# Patient Record
Sex: FEMALE | Employment: UNEMPLOYED | URBAN - METROPOLITAN AREA
[De-identification: names, ages, dates, MRNs, and addresses within clinical notes are randomized per-mention and may not be internally consistent; named-entity substitution may affect disease eponyms.]

---

## 2024-06-24 RX ORDER — PRAVASTATIN SODIUM 20 MG
TABLET ORAL DAILY
COMMUNITY

## 2024-06-25 ENCOUNTER — OFFICE VISIT (OUTPATIENT)
Dept: FAMILY MEDICINE CLINIC | Age: 64
End: 2024-06-25
Payer: COMMERCIAL

## 2024-06-25 VITALS
HEIGHT: 68 IN | HEART RATE: 86 BPM | TEMPERATURE: 97.3 F | DIASTOLIC BLOOD PRESSURE: 70 MMHG | BODY MASS INDEX: 40.32 KG/M2 | OXYGEN SATURATION: 96 % | WEIGHT: 266 LBS | SYSTOLIC BLOOD PRESSURE: 120 MMHG | RESPIRATION RATE: 14 BRPM

## 2024-06-25 DIAGNOSIS — Z98.84 S/P GASTRIC BYPASS: ICD-10-CM

## 2024-06-25 DIAGNOSIS — E78.5 HYPERLIPIDEMIA, UNSPECIFIED HYPERLIPIDEMIA TYPE: ICD-10-CM

## 2024-06-25 DIAGNOSIS — F41.1 GAD (GENERALIZED ANXIETY DISORDER): ICD-10-CM

## 2024-06-25 DIAGNOSIS — K21.9 GASTROESOPHAGEAL REFLUX DISEASE WITHOUT ESOPHAGITIS: ICD-10-CM

## 2024-06-25 DIAGNOSIS — E66.01 MORBID OBESITY WITH BMI OF 40.0-44.9, ADULT (HCC): ICD-10-CM

## 2024-06-25 DIAGNOSIS — R51.9 ACUTE NONINTRACTABLE HEADACHE, UNSPECIFIED HEADACHE TYPE: ICD-10-CM

## 2024-06-25 DIAGNOSIS — Z00.00 ENCOUNTER FOR MEDICAL EXAMINATION TO ESTABLISH CARE: Primary | ICD-10-CM

## 2024-06-25 DIAGNOSIS — Z87.891 FORMER SMOKER: ICD-10-CM

## 2024-06-25 DIAGNOSIS — Z23 NEED FOR DIPHTHERIA-TETANUS-PERTUSSIS (TDAP) VACCINE: ICD-10-CM

## 2024-06-25 DIAGNOSIS — F51.01 PRIMARY INSOMNIA: ICD-10-CM

## 2024-06-25 PROCEDURE — 99204 OFFICE O/P NEW MOD 45 MIN: CPT | Performed by: STUDENT IN AN ORGANIZED HEALTH CARE EDUCATION/TRAINING PROGRAM

## 2024-06-25 RX ORDER — BUTALBITAL, ACETAMINOPHEN AND CAFFEINE 50; 325; 40 MG/1; MG/1; MG/1
2 TABLET ORAL EVERY 12 HOURS PRN
Qty: 30 TABLET | Refills: 1 | Status: SHIPPED | OUTPATIENT
Start: 2024-06-25

## 2024-06-25 RX ORDER — SERTRALINE HYDROCHLORIDE 200 MG/1
200 CAPSULE ORAL DAILY
COMMUNITY

## 2024-06-25 RX ORDER — PANTOPRAZOLE SODIUM 20 MG/1
20 TABLET, DELAYED RELEASE ORAL DAILY
COMMUNITY
Start: 2024-05-02

## 2024-06-25 RX ORDER — TEMAZEPAM 15 MG/1
15 CAPSULE ORAL NIGHTLY PRN
COMMUNITY

## 2024-06-25 RX ORDER — ATORVASTATIN CALCIUM 10 MG/1
10 TABLET, FILM COATED ORAL DAILY
COMMUNITY

## 2024-06-25 RX ORDER — AMITRIPTYLINE HYDROCHLORIDE 25 MG/1
50 TABLET, FILM COATED ORAL NIGHTLY
COMMUNITY
Start: 2024-05-02

## 2024-06-25 RX ORDER — BUTALBITAL, ACETAMINOPHEN AND CAFFEINE 50; 325; 40 MG/1; MG/1; MG/1
2 TABLET ORAL EVERY 4 HOURS PRN
COMMUNITY
End: 2024-06-25 | Stop reason: SDUPTHER

## 2024-06-25 RX ORDER — PSYLLIUM HUSK (WITH SUGAR) 3.4 G/7 G
POWDER (GRAM) ORAL
COMMUNITY

## 2024-06-25 SDOH — ECONOMIC STABILITY: FOOD INSECURITY: WITHIN THE PAST 12 MONTHS, YOU WORRIED THAT YOUR FOOD WOULD RUN OUT BEFORE YOU GOT MONEY TO BUY MORE.: NEVER TRUE

## 2024-06-25 SDOH — ECONOMIC STABILITY: INCOME INSECURITY: HOW HARD IS IT FOR YOU TO PAY FOR THE VERY BASICS LIKE FOOD, HOUSING, MEDICAL CARE, AND HEATING?: NOT VERY HARD

## 2024-06-25 SDOH — ECONOMIC STABILITY: HOUSING INSECURITY
IN THE LAST 12 MONTHS, WAS THERE A TIME WHEN YOU DID NOT HAVE A STEADY PLACE TO SLEEP OR SLEPT IN A SHELTER (INCLUDING NOW)?: NO

## 2024-06-25 SDOH — ECONOMIC STABILITY: FOOD INSECURITY: WITHIN THE PAST 12 MONTHS, THE FOOD YOU BOUGHT JUST DIDN'T LAST AND YOU DIDN'T HAVE MONEY TO GET MORE.: NEVER TRUE

## 2024-06-25 ASSESSMENT — PATIENT HEALTH QUESTIONNAIRE - PHQ9
2. FEELING DOWN, DEPRESSED OR HOPELESS: NOT AT ALL
SUM OF ALL RESPONSES TO PHQ QUESTIONS 1-9: 0
1. LITTLE INTEREST OR PLEASURE IN DOING THINGS: NOT AT ALL
SUM OF ALL RESPONSES TO PHQ QUESTIONS 1-9: 0
SUM OF ALL RESPONSES TO PHQ9 QUESTIONS 1 & 2: 0

## 2024-06-25 ASSESSMENT — ANXIETY QUESTIONNAIRES
IF YOU CHECKED OFF ANY PROBLEMS ON THIS QUESTIONNAIRE, HOW DIFFICULT HAVE THESE PROBLEMS MADE IT FOR YOU TO DO YOUR WORK, TAKE CARE OF THINGS AT HOME, OR GET ALONG WITH OTHER PEOPLE: NOT DIFFICULT AT ALL
5. BEING SO RESTLESS THAT IT IS HARD TO SIT STILL: NOT AT ALL
GAD7 TOTAL SCORE: 0
3. WORRYING TOO MUCH ABOUT DIFFERENT THINGS: NOT AT ALL
4. TROUBLE RELAXING: NOT AT ALL
1. FEELING NERVOUS, ANXIOUS, OR ON EDGE: NOT AT ALL
6. BECOMING EASILY ANNOYED OR IRRITABLE: NOT AT ALL
7. FEELING AFRAID AS IF SOMETHING AWFUL MIGHT HAPPEN: NOT AT ALL
2. NOT BEING ABLE TO STOP OR CONTROL WORRYING: NOT AT ALL

## 2024-06-25 NOTE — PROGRESS NOTES
Milli Rolon  YOB: 1960    Date of Service:  6/25/2024    Chief Complaint:   Milli Rolon is a 63 y.o. female who presents for    Concerns:   Chief Complaint   Patient presents with    New Patient     No other concerns       Headache       HPI:  Patient is a 63 y.o. female  has a past medical history of history of gastric bypass, anxiety, obesity, headache, and Hyperlipidemia. who presents to [facility] care.  Recently moved from [place], [place]  Sees her PCP every 3 months.  Most recent bloodwork normal, iron low.  , CMP, TSH-within normal limits, hemoglobin 11.2 MCV 79 , hemoglobin A1c 6.2  2/2024 .  Lipid panel is normal  Not taking irorn   Headache: Weather related. Maybe 2-3 times a week.  Takes Fiorcet.  As needed.  Frontal headaches. No nausea, vomiting, light or sound sensitivity.    History of gastric bypass, has lost 160 pounds after surgery.  Weight management : Currently on compounded tirzepatide 10 mg weekly, a few months. Lost 20 lbs.   Hyperlipidemia : On Lipitor 10 mg daily   Anxiety: Mood has been stable on Zoloft 200 mg daily.  No side effects.    Insomnia: Sleeping well on Elavil 25 mg 50 mg a night and  temazepam (RESTORIL) 15 MG capsule 2 nightly.  Previously on Lunesta, Ambien, trazodone, lorazepam.  Has also tried melatonin.  Endorses difficulty staying asleep.   GERD:-Pantoprazole 20 mg daily     Social history   Lives at home with niece 2 cats and 2 dogs , 3 boys   Diet - Balanced, proteins , cottage cheese , limited fruits and vegetables   Exercise - Regular, in the house, moved 1 month ago   Tobacco use/smoking history- 30 years , quit 2013 , 1.5 packs daily   Alcohol use- limited   Illicit drug use history- medical marijuana in [place]   Mood - phq 2 of       6/25/2024     2:23 PM   PHQ-9    Little interest or pleasure in doing things 0   Feeling down, depressed, or hopeless 0   PHQ-2 Score 0   PHQ-9 Total Score 0     Sexually active - none   Menses - No LMP

## 2024-06-25 NOTE — PATIENT INSTRUCTIONS
- Continue current medications   - Follow up with Sleep medicine or Psych for temazepam (RESTORIL) 15 MG capsule  - Follow up in 3 - 6 months    ([facility]  Mental health clinic in [place], [place]  Address: [address], [place] [address]  Phone: [phone]  Appointments: [employer]  )

## 2024-06-26 ENCOUNTER — TELEPHONE (OUTPATIENT)
Dept: FAMILY MEDICINE CLINIC | Age: 64
End: 2024-06-26

## 2024-07-10 ENCOUNTER — TELEPHONE (OUTPATIENT)
Dept: PULMONOLOGY | Age: 64
End: 2024-07-10

## 2024-07-10 NOTE — TELEPHONE ENCOUNTER
Pt has upcoming appt next week and appt notes indicate that pt had a sleep study approx 2016. Spoke with pt and all she can remember is that her sleep study was done on [address] and her PAP machine came from a DME on [facility].  Pt no longer has the PAP machine.     Faxed request to [facility] (formerly sleep care) and [facility] looking for sleep studies.

## 2024-07-17 ENCOUNTER — OFFICE VISIT (OUTPATIENT)
Dept: PULMONOLOGY | Age: 64
End: 2024-07-17
Payer: COMMERCIAL

## 2024-07-17 VITALS
WEIGHT: 261 LBS | DIASTOLIC BLOOD PRESSURE: 70 MMHG | HEIGHT: 68 IN | BODY MASS INDEX: 39.56 KG/M2 | HEART RATE: 88 BPM | SYSTOLIC BLOOD PRESSURE: 122 MMHG | OXYGEN SATURATION: 99 % | RESPIRATION RATE: 20 BRPM

## 2024-07-17 DIAGNOSIS — F51.04 CHRONIC INSOMNIA: Primary | ICD-10-CM

## 2024-07-17 DIAGNOSIS — G47.33 OSA (OBSTRUCTIVE SLEEP APNEA): ICD-10-CM

## 2024-07-17 PROBLEM — Z98.84 S/P GASTRIC BYPASS: Chronic | Status: ACTIVE | Noted: 2024-06-25

## 2024-07-17 PROCEDURE — 99204 OFFICE O/P NEW MOD 45 MIN: CPT

## 2024-07-17 RX ORDER — TEMAZEPAM 15 MG/1
30 CAPSULE ORAL NIGHTLY PRN
Qty: 180 CAPSULE | Refills: 0 | Status: SHIPPED | OUTPATIENT
Start: 2024-08-01 | End: 2024-10-30

## 2024-07-17 ASSESSMENT — SLEEP AND FATIGUE QUESTIONNAIRES
ESS TOTAL SCORE: 2
HOW LIKELY ARE YOU TO NOD OFF OR FALL ASLEEP WHILE LYING DOWN TO REST IN THE AFTERNOON WHEN CIRCUMSTANCES PERMIT: WOULD NEVER DOZE
HOW LIKELY ARE YOU TO NOD OFF OR FALL ASLEEP WHILE SITTING AND TALKING TO SOMEONE: WOULD NEVER DOZE
HOW LIKELY ARE YOU TO NOD OFF OR FALL ASLEEP WHILE SITTING QUIETLY AFTER LUNCH WITHOUT ALCOHOL: WOULD NEVER DOZE
HOW LIKELY ARE YOU TO NOD OFF OR FALL ASLEEP IN A CAR, WHILE STOPPED FOR A FEW MINUTES IN TRAFFIC: WOULD NEVER DOZE
HOW LIKELY ARE YOU TO NOD OFF OR FALL ASLEEP WHILE WATCHING TV: WOULD NEVER DOZE
HOW LIKELY ARE YOU TO NOD OFF OR FALL ASLEEP WHILE SITTING AND READING: WOULD NEVER DOZE
HOW LIKELY ARE YOU TO NOD OFF OR FALL ASLEEP WHEN YOU ARE A PASSENGER IN A CAR FOR AN HOUR WITHOUT A BREAK: MODERATE CHANCE OF DOZING
HOW LIKELY ARE YOU TO NOD OFF OR FALL ASLEEP WHILE SITTING INACTIVE IN A PUBLIC PLACE: WOULD NEVER DOZE

## 2024-07-17 NOTE — PROGRESS NOTES
SLEEP MEDICINE CONSULT NOTE  CC: Insomnia  Referring Provider: Patient is being seen at the request of Dr. Adela Rebollar for a consultation to evaluate & manage Insomnia.    Presenting HPI 7/17/24:  64 yo female presents with a nearly 20 year h/o insomnia ever since menopause in her 40s.  She also has a h/o very severe LUCIANO with AHI >100 in 2017, which was prior to bariatric surgery.  She was on CPAP and tolerated it well but it did not provide any perceived benefit at all so she abandoned therapy after weight loss.  She did retry the CPAP about 2 years ago and still did not perceive benefit.  She was previously ~350-370# and is now 260#.  Drinks 150 mg caffeinated beverages/day.    Insomnia hx:  Did not have trouble sleeping before menopause, still with some menopausal sxs (hot flashes.)  Has been on pharmacotherapy since her 40s; has tried Melatonin, Lorazepam, Trazodone lost effect after 1 night, then Lorazepam seemed to lose effect, Ambien, Ambien CR and Lunesta were each tried for about 1 month and could only help with sleep for the first 1-2 hours.  Currently, she is on Temazepam; has 15 mg tabs but takes 30 most nights.  Has been on Temazepam for a few years, titrated from 15 to 30 after a few nights.  Originally started while she lived in [place] for 5 years.  Also takes Amitriptyline started for headache prophylaxis and sleep.  Does not work for HAs, and she cannot tell much difference in sleep.   Routine: To bed 10:30, watch TV until getting tired takes 15 mg Temazepam.  To sleep 11p-MN.  Sleeps for 1-2 hours then awakens and almost always needs the second dose of Temazepam.  She avoids taking them together as this previously caused enuresis when first initiating.  After waking up, she gets back to sleep quickly ~50% of the time.  Otherwise takes 45 mins of tossing and turning or reading on her ipad.  To restroom 2x per night.  Up at 8-9a.     Past Medical History:   Diagnosis Date    Anxiety     Headache

## 2024-07-17 NOTE — PROGRESS NOTES
MA Communication:  The following orders are received by verbal communication from Marcia Caballero PA-C.     Orders include:  CBTi session 1 and sleep log-GIVEN   HST and f/u  Med contract-signed by pt

## 2024-07-17 NOTE — PATIENT INSTRUCTIONS
may help reduce the risk of heart disease, stroke, car accidents, type II diabetes & all cause mortality   In the next few weeks, you will be receiving a survey from [facility] regarding your visit today.  We would greatly appreciate it if you would take just a few minutes to fill that out.  It is very important to us that our patients receive top notch care and our surveys help keep us accountable. However, if your experience was not a good one, we want to hear about that as well. This is a key way we can keep track of problems and strive to correct any for future visits.    Again, we appreciate your time and thank you for choosing [facility]!    RANCHO Guillen

## 2024-08-07 ENCOUNTER — HOSPITAL ENCOUNTER (OUTPATIENT)
Dept: SLEEP CENTER | Age: 64
Discharge: HOME | End: 2024-08-09
Payer: COMMERCIAL

## 2024-08-07 DIAGNOSIS — F51.04 CHRONIC INSOMNIA: ICD-10-CM

## 2024-08-07 PROCEDURE — 95806 SLEEP STUDY UNATT&RESP EFFT: CPT

## 2024-08-20 ASSESSMENT — SLEEP AND FATIGUE QUESTIONNAIRES
HOW LIKELY ARE YOU TO NOD OFF OR FALL ASLEEP WHILE WATCHING TV: SLIGHT CHANCE OF DOZING
HOW LIKELY ARE YOU TO NOD OFF OR FALL ASLEEP WHILE SITTING INACTIVE IN A PUBLIC PLACE: WOULD NEVER DOZE
HOW LIKELY ARE YOU TO NOD OFF OR FALL ASLEEP IN A CAR, WHILE STOPPED FOR A FEW MINUTES IN TRAFFIC: WOULD NEVER DOZE
HOW LIKELY ARE YOU TO NOD OFF OR FALL ASLEEP WHILE SITTING AND TALKING TO SOMEONE: WOULD NEVER DOZE
HOW LIKELY ARE YOU TO NOD OFF OR FALL ASLEEP WHILE SITTING INACTIVE IN A PUBLIC PLACE: WOULD NEVER DOZE
HOW LIKELY ARE YOU TO NOD OFF OR FALL ASLEEP WHEN YOU ARE A PASSENGER IN A CAR FOR AN HOUR WITHOUT A BREAK: MODERATE CHANCE OF DOZING
HOW LIKELY ARE YOU TO NOD OFF OR FALL ASLEEP WHILE SITTING AND READING: SLIGHT CHANCE OF DOZING
HOW LIKELY ARE YOU TO NOD OFF OR FALL ASLEEP WHILE SITTING AND TALKING TO SOMEONE: WOULD NEVER DOZE
HOW LIKELY ARE YOU TO NOD OFF OR FALL ASLEEP WHEN YOU ARE A PASSENGER IN A CAR FOR AN HOUR WITHOUT A BREAK: MODERATE CHANCE OF DOZING
HOW LIKELY ARE YOU TO NOD OFF OR FALL ASLEEP WHILE LYING DOWN TO REST IN THE AFTERNOON WHEN CIRCUMSTANCES PERMIT: SLIGHT CHANCE OF DOZING
HOW LIKELY ARE YOU TO NOD OFF OR FALL ASLEEP WHILE WATCHING TV: SLIGHT CHANCE OF DOZING
HOW LIKELY ARE YOU TO NOD OFF OR FALL ASLEEP WHILE SITTING AND READING: SLIGHT CHANCE OF DOZING
HOW LIKELY ARE YOU TO NOD OFF OR FALL ASLEEP WHILE LYING DOWN TO REST IN THE AFTERNOON WHEN CIRCUMSTANCES PERMIT: SLIGHT CHANCE OF DOZING
HOW LIKELY ARE YOU TO NOD OFF OR FALL ASLEEP IN A CAR, WHILE STOPPED FOR A FEW MINUTES IN TRAFFIC: WOULD NEVER DOZE
HOW LIKELY ARE YOU TO NOD OFF OR FALL ASLEEP WHILE SITTING QUIETLY AFTER LUNCH WITHOUT ALCOHOL: WOULD NEVER DOZE
HOW LIKELY ARE YOU TO NOD OFF OR FALL ASLEEP WHILE SITTING QUIETLY AFTER LUNCH WITHOUT ALCOHOL: WOULD NEVER DOZE
ESS TOTAL SCORE: 5

## 2024-08-21 ENCOUNTER — OFFICE VISIT (OUTPATIENT)
Dept: PULMONOLOGY | Age: 64
End: 2024-08-21
Payer: COMMERCIAL

## 2024-08-21 VITALS
HEIGHT: 68 IN | HEART RATE: 74 BPM | RESPIRATION RATE: 16 BRPM | BODY MASS INDEX: 38.95 KG/M2 | TEMPERATURE: 97 F | OXYGEN SATURATION: 97 % | DIASTOLIC BLOOD PRESSURE: 68 MMHG | SYSTOLIC BLOOD PRESSURE: 122 MMHG | WEIGHT: 257 LBS

## 2024-08-21 DIAGNOSIS — G47.33 OSA (OBSTRUCTIVE SLEEP APNEA): Primary | ICD-10-CM

## 2024-08-21 DIAGNOSIS — F51.04 CHRONIC INSOMNIA: ICD-10-CM

## 2024-08-21 DIAGNOSIS — Z98.84 S/P GASTRIC BYPASS: Chronic | ICD-10-CM

## 2024-08-21 PROCEDURE — 99214 OFFICE O/P EST MOD 30 MIN: CPT

## 2024-08-21 NOTE — PATIENT INSTRUCTIONS
What's next:  CBTi session 2 and sleep log    Trying auto-CPAP:  We will wait to hear from you which medical supply company (\"DME company\") we should send the CPAP prescription to (see list of companies below).  The best way to choose a company is by calling your insurance and asking which DME companies are in network for you, and choosing from those options.  That DME company will get you set up with your machine and equipment.    After getting home with your machine, I recommend trying to de-sensitize yourself to the CPAP & mask--people often acclimate better if they try it out during the day and practice breathing with it while focusing on another task.  You can ask your DME for a different mask if what you first tried isn't comfortable.  Also, feel free to call the office if the air pressures are not tolerable--I can make adjustments to the settings while keeping your treatment efficacy in mind.   After getting set up with CPAP, you come back and see me within 31-90 days.  At this appointment, insurance typically needs to see that you are using the device at least 4 hours each night for at least 70% of nights in a month.  Please bring your machine and power cord to this appointment.       It was great to see you again and take care Milli!  -Marcia    ______________________________________________________________________  Never drive a car or operate a motorized vehicle while drowsy or sleepy.   ______________________________________________________________________      Sleep Hygiene... Important practices for better sleep:  Avoid naps. This will ensure you are sleepy at bedtime.  If you have to take a nap, sleep 30 minutes or less before 3 pm.  Have a fixed bedtime and awakening time. The human body thrives on routines. Only deviate from these set times by no more than 1 hour, including on weekends.  Avoid exposure to electronics/screens within 2 hours of your desired sleep time. Light from screens inhibits

## 2024-08-21 NOTE — PROGRESS NOTES
MA Communication:  The following orders are received by verbal communication from BRADY Farrell    Orders include:    31-90 days s/c 10/23 at 9:30 AM  
pharmacotherapy since her 40s; has tried Melatonin, Lorazepam, Trazodone lost effect after 1 night, then Lorazepam seemed to lose effect, Ambien, Ambien CR and Lunesta were each tried for about 1 month and could only help with sleep for the first 1-2 hours.  Currently, she is on Temazepam; has 15 mg tabs but takes 30 most nights.  Has been on Temazepam for a few years, titrated from 15 to 30 after a few nights.  Originally started while she lived in [place] for 5 years.  Also takes Amitriptyline started for headache prophylaxis and sleep.  Does not work for HAs, and she cannot tell much difference in sleep.   Routine: To bed 10:30, watch TV until getting tired takes 15 mg Temazepam.  To sleep 11p-MN.  Sleeps for 1-2 hours then awakens and almost always needs the second dose of Temazepam.  She avoids taking them together as this previously caused enuresis when first initiating.  After waking up, she gets back to sleep quickly ~50% of the time.  Otherwise takes 45 mins of tossing and turning or reading on her ipad.  To restroom 2x per night.  Up at 8-9a.  ESS: 2.     PHYSICAL EXAM:  Blood pressure 122/68, pulse 74, temperature 97 °F (36.1 °C), temperature source Temporal, resp. rate 16, height 1.727 m (5' 8\"), weight 116.6 kg (257 lb), SpO2 97%.'   261# Jul 2024;   Constitutional:  No acute distress. Very pleasant.   HENT:  Oropharynx is clear and moist. Mallampati class I.  Eyes: Pupils equal, round, and reactive to light. No scleral icterus.   Neck: No tracheal deviation present.  Circumference 14.25 inches   Cardiovascular: Normal rate & rhythm.  No lower extremity edema.  Pulmonary/Chest: Clear breath sounds. No obvious wheezes. No accessory muscle usage.   Musculoskeletal: No cyanosis. No clubbing.  Skin: Skin is warm and dry.   Psychiatric: Normal mood and affect.    DATA:  Review of PCP records    PSG @ [facility] 4/17/2017 (performed for bariatric surgical clearance):  .9.  , SE 80%.  SL 41, REM .

## 2024-08-22 ENCOUNTER — TELEPHONE (OUTPATIENT)
Dept: PULMONOLOGY | Age: 64
End: 2024-08-22

## 2024-08-22 NOTE — TELEPHONE ENCOUNTER
Per [employer] message, pt has chosen [facility] as her DME.  PAP orders faxed, with testing/OV note/demographics/insurance, to T[facility] via [employer] at [phone]. Notified PSS

## 2024-09-23 ENCOUNTER — PATIENT MESSAGE (OUTPATIENT)
Dept: FAMILY MEDICINE CLINIC | Age: 64
End: 2024-09-23

## 2024-09-23 ENCOUNTER — PATIENT MESSAGE (OUTPATIENT)
Dept: PULMONOLOGY | Age: 64
End: 2024-09-23

## 2024-09-23 DIAGNOSIS — E66.01 MORBID OBESITY WITH BMI OF 40.0-44.9, ADULT: ICD-10-CM

## 2024-09-23 DIAGNOSIS — Z98.84 S/P GASTRIC BYPASS: ICD-10-CM

## 2024-09-23 DIAGNOSIS — G47.33 OSA (OBSTRUCTIVE SLEEP APNEA): Primary | ICD-10-CM

## 2024-09-25 ENCOUNTER — TELEPHONE (OUTPATIENT)
Dept: ADMINISTRATIVE | Age: 64
End: 2024-09-25

## 2024-10-09 ENCOUNTER — PATIENT MESSAGE (OUTPATIENT)
Dept: FAMILY MEDICINE CLINIC | Age: 64
End: 2024-10-09

## 2024-10-11 NOTE — TELEPHONE ENCOUNTER
Please call patient and advise her that she will need to make an appointment so we can discuss switching the medication from tirzepatide to semaglutide, as a medication follow-up    Dr bradley

## 2024-10-14 ENCOUNTER — OFFICE VISIT (OUTPATIENT)
Dept: FAMILY MEDICINE CLINIC | Age: 64
End: 2024-10-14
Payer: COMMERCIAL

## 2024-10-14 VITALS
RESPIRATION RATE: 16 BRPM | OXYGEN SATURATION: 96 % | WEIGHT: 249 LBS | TEMPERATURE: 98.3 F | BODY MASS INDEX: 37.86 KG/M2 | DIASTOLIC BLOOD PRESSURE: 60 MMHG | HEART RATE: 88 BPM | SYSTOLIC BLOOD PRESSURE: 120 MMHG

## 2024-10-14 DIAGNOSIS — F41.1 GAD (GENERALIZED ANXIETY DISORDER): ICD-10-CM

## 2024-10-14 DIAGNOSIS — G47.33 OSA (OBSTRUCTIVE SLEEP APNEA): Chronic | ICD-10-CM

## 2024-10-14 DIAGNOSIS — K21.9 GASTROESOPHAGEAL REFLUX DISEASE WITHOUT ESOPHAGITIS: ICD-10-CM

## 2024-10-14 DIAGNOSIS — Z23 NEED FOR TDAP VACCINATION: ICD-10-CM

## 2024-10-14 DIAGNOSIS — F51.04 CHRONIC INSOMNIA: Chronic | ICD-10-CM

## 2024-10-14 DIAGNOSIS — E78.5 HYPERLIPIDEMIA, UNSPECIFIED HYPERLIPIDEMIA TYPE: ICD-10-CM

## 2024-10-14 DIAGNOSIS — E66.01 CLASS 2 SEVERE OBESITY WITH SERIOUS COMORBIDITY AND BODY MASS INDEX (BMI) OF 39.0 TO 39.9 IN ADULT, UNSPECIFIED OBESITY TYPE: Primary | ICD-10-CM

## 2024-10-14 DIAGNOSIS — E66.812 CLASS 2 SEVERE OBESITY WITH SERIOUS COMORBIDITY AND BODY MASS INDEX (BMI) OF 39.0 TO 39.9 IN ADULT, UNSPECIFIED OBESITY TYPE: Primary | ICD-10-CM

## 2024-10-14 DIAGNOSIS — Z98.84 HISTORY OF BARIATRIC SURGERY: ICD-10-CM

## 2024-10-14 PROCEDURE — 90471 IMMUNIZATION ADMIN: CPT | Performed by: STUDENT IN AN ORGANIZED HEALTH CARE EDUCATION/TRAINING PROGRAM

## 2024-10-14 PROCEDURE — 99214 OFFICE O/P EST MOD 30 MIN: CPT | Performed by: STUDENT IN AN ORGANIZED HEALTH CARE EDUCATION/TRAINING PROGRAM

## 2024-10-14 PROCEDURE — 90715 TDAP VACCINE 7 YRS/> IM: CPT | Performed by: STUDENT IN AN ORGANIZED HEALTH CARE EDUCATION/TRAINING PROGRAM

## 2024-10-14 SDOH — ECONOMIC STABILITY: FOOD INSECURITY: WITHIN THE PAST 12 MONTHS, THE FOOD YOU BOUGHT JUST DIDN'T LAST AND YOU DIDN'T HAVE MONEY TO GET MORE.: NEVER TRUE

## 2024-10-14 SDOH — ECONOMIC STABILITY: INCOME INSECURITY: HOW HARD IS IT FOR YOU TO PAY FOR THE VERY BASICS LIKE FOOD, HOUSING, MEDICAL CARE, AND HEATING?: NOT VERY HARD

## 2024-10-14 SDOH — ECONOMIC STABILITY: FOOD INSECURITY: WITHIN THE PAST 12 MONTHS, YOU WORRIED THAT YOUR FOOD WOULD RUN OUT BEFORE YOU GOT MONEY TO BUY MORE.: NEVER TRUE

## 2024-10-14 ASSESSMENT — PATIENT HEALTH QUESTIONNAIRE - PHQ9
SUM OF ALL RESPONSES TO PHQ9 QUESTIONS 1 & 2: 0
SUM OF ALL RESPONSES TO PHQ QUESTIONS 1-9: 0
2. FEELING DOWN, DEPRESSED OR HOPELESS: NOT AT ALL
SUM OF ALL RESPONSES TO PHQ QUESTIONS 1-9: 0
1. LITTLE INTEREST OR PLEASURE IN DOING THINGS: NOT AT ALL
SUM OF ALL RESPONSES TO PHQ QUESTIONS 1-9: 0
SUM OF ALL RESPONSES TO PHQ QUESTIONS 1-9: 0

## 2024-10-14 NOTE — PATIENT INSTRUCTIONS
Semaglutide: It works solely as a GLP-1 receptor agonist in the brain, causing less hunger and reduced cravings for food. Tirzepatide: Combines the actions of GLP-1 and GIP receptor agonists, providing a greater reduction in appetite than semaglutide alone.       New Prescriptions    SEMAGLUTIDE,0.25 OR 0.5MG/DOS, 2 MG/1.5ML SOPN    Inject 0.25 mg SC once weekly x 4 weeks then increase to 0.5 mg SC once weekly for 4 weeks, then increase to 1 mg SC once weekly.     Health Maintenance Due   Topic Date Due    HIV screen  Never done    Hepatitis C screen  Never done    Cervical cancer screen  Never done    Diabetes screen  Never done    Lung Cancer Screening &/or Counseling  Never done    Lipids  05/20/2014    Respiratory Syncytial Virus (RSV) Pregnant or age 60 yrs+ (1 - 1-dose 60+ series) Never done    DTaP/Tdap/Td vaccine (2 - Td or Tdap) 01/01/2024

## 2024-10-14 NOTE — PROGRESS NOTES
[facility]  [address]  [address]  [place], [place] [address]  Tel: [phone]      10/14/2024   SUBJECTIVE/OBJECTIVE  HPI    Milli Rolon (:  1960) is a 63 y.o. female, here for evaluation of the following medical concerns:  Chief Complaint   Patient presents with    Medication Check   HPI:  Patient is a 63 y.o. female  has a past medical history of history of gastric bypass, anxiety, obesity, headache, and Hyperlipidemia. who presents to discuss medication changes,   Obesity and obstructive sleep apnea  The patient is a 63-year-old female who is interested in switching from Zepbound to semaglutide for weight loss management. she reports she denies any side effects from Zepbound, no longer available from [facility] pharmacy..  Denies any side effects of medication.  Patient had noted some weight loss.    Had been taking Zepbound, 12.5 injections. Had lost 8 lbs since last visit. History of bariatric surgery, had tried counting calories. Reports chasing kids for exercise.   The patient is motivated to achieve her weight loss goals and is seeking guidance on the use of semaglutide.   Denies any family history of multiple endocrine neoplasia or thyroid cancer.  No issues with gastroparesis/constipation.  No history of pancreatitis or alcohol abuse.  Reports healthier , diet, smaller amounts. Icludes protein and vegetables.   No dizzinesss, flighteadness, nausea, vomiting, abdominal pain, contipation, CP or SOB. Normal mood.     Obstructive sleep apnea, seen by pulmonology on 2024, started on CAPAP 5-15 cm H20, discontinued Elavil.  Temazepam 30 mg nightly  Insomnia: Sleeping well  temazepam (RESTORIL) 15 MG capsule 2 nightly.  Previously on Lunesta, Ambien, trazodone, lorazepam.  Has also tried melatonin.  Endorses difficulty staying asleep.   Anxiety: Mood has been stable on Zoloft 200 mg daily.  No side effects.      GERD:-Pantoprazole 20 mg daily    Hyperlipidemia : On

## 2024-10-31 ASSESSMENT — SLEEP AND FATIGUE QUESTIONNAIRES
HOW LIKELY ARE YOU TO NOD OFF OR FALL ASLEEP WHILE SITTING AND READING: SLIGHT CHANCE OF DOZING
HOW LIKELY ARE YOU TO NOD OFF OR FALL ASLEEP WHILE SITTING QUIETLY AFTER LUNCH WITHOUT ALCOHOL: SLIGHT CHANCE OF DOZING
HOW LIKELY ARE YOU TO NOD OFF OR FALL ASLEEP WHILE SITTING INACTIVE IN A PUBLIC PLACE: WOULD NEVER DOZE
HOW LIKELY ARE YOU TO NOD OFF OR FALL ASLEEP IN A CAR, WHILE STOPPED FOR A FEW MINUTES IN TRAFFIC: WOULD NEVER DOZE
HOW LIKELY ARE YOU TO NOD OFF OR FALL ASLEEP WHILE SITTING AND TALKING TO SOMEONE: WOULD NEVER DOZE
HOW LIKELY ARE YOU TO NOD OFF OR FALL ASLEEP WHILE LYING DOWN TO REST IN THE AFTERNOON WHEN CIRCUMSTANCES PERMIT: MODERATE CHANCE OF DOZING
HOW LIKELY ARE YOU TO NOD OFF OR FALL ASLEEP WHILE WATCHING TV: SLIGHT CHANCE OF DOZING
HOW LIKELY ARE YOU TO NOD OFF OR FALL ASLEEP WHEN YOU ARE A PASSENGER IN A CAR FOR AN HOUR WITHOUT A BREAK: MODERATE CHANCE OF DOZING
HOW LIKELY ARE YOU TO NOD OFF OR FALL ASLEEP WHILE SITTING AND TALKING TO SOMEONE: WOULD NEVER DOZE
HOW LIKELY ARE YOU TO NOD OFF OR FALL ASLEEP WHILE SITTING AND READING: SLIGHT CHANCE OF DOZING
HOW LIKELY ARE YOU TO NOD OFF OR FALL ASLEEP WHILE LYING DOWN TO REST IN THE AFTERNOON WHEN CIRCUMSTANCES PERMIT: MODERATE CHANCE OF DOZING
ESS TOTAL SCORE: 7
HOW LIKELY ARE YOU TO NOD OFF OR FALL ASLEEP WHEN YOU ARE A PASSENGER IN A CAR FOR AN HOUR WITHOUT A BREAK: MODERATE CHANCE OF DOZING
HOW LIKELY ARE YOU TO NOD OFF OR FALL ASLEEP IN A CAR, WHILE STOPPED FOR A FEW MINUTES IN TRAFFIC: WOULD NEVER DOZE
HOW LIKELY ARE YOU TO NOD OFF OR FALL ASLEEP WHILE SITTING INACTIVE IN A PUBLIC PLACE: WOULD NEVER DOZE
HOW LIKELY ARE YOU TO NOD OFF OR FALL ASLEEP WHILE SITTING QUIETLY AFTER LUNCH WITHOUT ALCOHOL: SLIGHT CHANCE OF DOZING
HOW LIKELY ARE YOU TO NOD OFF OR FALL ASLEEP WHILE WATCHING TV: SLIGHT CHANCE OF DOZING

## 2024-11-01 ENCOUNTER — OFFICE VISIT (OUTPATIENT)
Age: 64
End: 2024-11-01

## 2024-11-01 VITALS
BODY MASS INDEX: 38.95 KG/M2 | RESPIRATION RATE: 20 BRPM | DIASTOLIC BLOOD PRESSURE: 70 MMHG | WEIGHT: 257 LBS | HEIGHT: 68 IN | OXYGEN SATURATION: 96 % | HEART RATE: 62 BPM | SYSTOLIC BLOOD PRESSURE: 110 MMHG

## 2024-11-01 DIAGNOSIS — G47.33 OSA (OBSTRUCTIVE SLEEP APNEA): Primary | Chronic | ICD-10-CM

## 2024-11-01 DIAGNOSIS — F51.04 CHRONIC INSOMNIA: Chronic | ICD-10-CM

## 2024-11-01 NOTE — PROGRESS NOTES
MA Communication:  The following orders are received by verbal communication from Marcia Caballero PA-C.     Orders include:    CBTi session 3 given   F/u 3 month

## 2024-11-01 NOTE — PATIENT INSTRUCTIONS
It was great to see you again and take care Milli!  -Marcia    [facility]     Never drive a car or operate a motorized vehicle while drowsy or sleepy.       Sleep Hygiene... Important practices for better sleep:  Avoid naps. This will ensure you are sleepy at bedtime.  If you have to take a nap, sleep 30 minutes or less before 3 pm.  Have a fixed bedtime and awakening time. The human body thrives on routines. Only deviate from these set times by no more than 1 hour, including on weekends.  Avoid exposure to electronics/screens within 2 hours of your desired sleep time. Light from screens inhibits melatonin production which stops our brain from helping us get to sleep.   Go to bed only when sleepy; this reduces the time you are awake in bed (which can lead to frustration and negative thoughts about sleep). If you can't fall asleep within 15-30 minutes, get up and do something boring until you feel sleepy again. Absolutely no electronic screens during this time.    Only use your bed for sleeping & intimacy. Do not use your bed as an office, workroom or recreation room.    Develop sleep rituals that you go through the same way every night.  Stay away from stimulants such as caffeine and nicotine. Caffeine can remain in your system for well over 10 hours, so no caffeine after lunch. Caffeine is found in tea, cola, coffee, cocoa and chocolate.    Avoid alcohol 2-4 hours before bedtime. As alcohol is metabolized, the result is a stimulant or \"wake-up\" effect and will cause fragmented sleep.   Regular exercise is recommended to help you deepen your sleep (and MANY other reasons), but timing is important--aim to exercise in the morning or early afternoon, not within 4 hours of your bedtime.   Don’t take worries to bed. Leave worries about life, work, school etc. behind you when you go to bed.    Ensure your bedroom is quiet and comfortable. A cool, dark room is recommended. A white noise machine can help eliminate

## 2024-11-01 NOTE — PROGRESS NOTES
SLEEP MEDICINE PROGRESS NOTE  CC: Insomnia  Referring Provider: Dr. Adela Rebollar    Interval History 11/1/24:  31-90  -  Set up with AirSense 10 ~Sep 2024.  Pt requested lower pressure so decreased to APAP 4-8 which somewhat helped with tolerance.  Has tried 2 mask styles.  Has adjusted humidity upward, has heated tubing.  She is not sure what is so bothersome about CPAP but just wants it off of her face at night.  She can fall asleep with it on but then unknowingly removes it pretty soon into the night.  Nothing in particular that is bothersome while she is awake and wearing it.  Tolerance is not improved even with taking Temazepam or increasing dose.  -  LUCIANO treated with benefit with APAP 4-8; used 1:19 hrs avg with compliance >4 hour use 0/30 (used 11) days, residual AHI 0.3.  Pressures: median 4.2, 95th% 5.3, max 5.4.  ESS: 7.     -  Continues using sleep log for her own benefit.  This helps her to look at her sleep objectively and prove to herself that she is getting more sleep than what he mind estimates.  Discussed mobile adalid platform to do this as well.  Takes both tabs of Temazepam about 1/2 of nights, otherwise takes only 1 tab or sometimes none at all.  Her goal is to only need one more script of it and then discontinue it altogether.  On nights she does not use it her sleep is lighter and more disrupted.     Interval History 8/21/24:  f/u HST  -  Had HST 8/8/24 demonstrating moderate to severe LUCIANO with AHI 29.  ESS is: 5.   Understands HST results and admits she is not looking forward to CPAP but willing to try and understands benefits to health.    -  Completed sleep log; realized getting more sleep and spending more time in bed than she thought.  Worked on sleep hygiene and eliminated caffeine after noon.  Difficult to rid of electronics before bed, reads on tablet, but easy to turn TV off.  Stopped Elavil without noticing a difference.  Actually went several days without temazepam and reports it did

## 2024-11-04 ENCOUNTER — TELEPHONE (OUTPATIENT)
Dept: PULMONOLOGY | Age: 64
End: 2024-11-04

## 2024-11-29 DIAGNOSIS — R51.9 ACUTE NONINTRACTABLE HEADACHE, UNSPECIFIED HEADACHE TYPE: ICD-10-CM

## 2024-12-03 ENCOUNTER — TELEPHONE (OUTPATIENT)
Dept: PULMONOLOGY | Age: 64
End: 2024-12-03

## 2024-12-03 DIAGNOSIS — F51.04 CHRONIC INSOMNIA: ICD-10-CM

## 2024-12-03 RX ORDER — BUTALBITAL, ACETAMINOPHEN AND CAFFEINE 50; 325; 40 MG/1; MG/1; MG/1
2 TABLET ORAL EVERY 12 HOURS PRN
Qty: 30 TABLET | Refills: 1 | Status: SHIPPED | OUTPATIENT
Start: 2024-12-03

## 2024-12-03 NOTE — TELEPHONE ENCOUNTER
1 month CR per LOV-scanned for review.     APAP 4-8 cmH2O --> decreased again to APAP 4-7 cmH2O, CR 1 mo please

## 2024-12-03 NOTE — TELEPHONE ENCOUNTER
Refill was requested yesterday, but script printed.  Pending to Marcia and confirmed that this script is selected as \"normal\" to go to pharmacy electronically.  My apologies for any confusion on the script yesterday.

## 2024-12-04 RX ORDER — TEMAZEPAM 15 MG/1
CAPSULE ORAL
Qty: 180 CAPSULE | Refills: 0 | Status: SHIPPED | OUTPATIENT
Start: 2024-12-04 | End: 2025-03-04

## 2024-12-04 NOTE — TELEPHONE ENCOUNTER
Please tell pt:  Does not appear compliance was met although I can see a significant increase in usage and good effort with CPAP.  Continuation of CPAP will be decided by pt and her insurance/DME.    Per LOV discussion, pt reported she would likely not be interested in another trial of CPAP if this did not work out but patient should let us know what she decides to do and if her DME asks for return of the machine. If she would like another trial then we would ask DME what is needed to continue and potentially proceed with repeat sleep study.  If she does plan to return the machine, then she can request we send an order to discontinue CPAP to her supplier.

## 2024-12-05 NOTE — TELEPHONE ENCOUNTER
Pt returned call and was informed with verbal understanding.  Pt will call office if she decides to proceed with PAP therapy.    [All other systems negative] : All other systems negative

## 2025-01-20 RX ORDER — ATORVASTATIN CALCIUM 10 MG/1
10 TABLET, FILM COATED ORAL NIGHTLY
Qty: 90 TABLET | Refills: 1 | Status: SHIPPED | OUTPATIENT
Start: 2025-01-20

## 2025-01-20 NOTE — TELEPHONE ENCOUNTER
Future Appointments   Date Time Provider Department Center   2/5/2025  9:30 AM Marcia Caballero PA-C [facility]           LOV 10/14/2024

## 2025-02-03 ASSESSMENT — SLEEP AND FATIGUE QUESTIONNAIRES
HOW LIKELY ARE YOU TO NOD OFF OR FALL ASLEEP WHEN YOU ARE A PASSENGER IN A CAR FOR AN HOUR WITHOUT A BREAK: SLIGHT CHANCE OF DOZING
HOW LIKELY ARE YOU TO NOD OFF OR FALL ASLEEP WHILE SITTING QUIETLY AFTER LUNCH WITHOUT ALCOHOL: WOULD NEVER DOZE
HOW LIKELY ARE YOU TO NOD OFF OR FALL ASLEEP IN A CAR, WHILE STOPPED FOR A FEW MINUTES IN TRAFFIC: WOULD NEVER DOZE
HOW LIKELY ARE YOU TO NOD OFF OR FALL ASLEEP IN A CAR, WHILE STOPPED FOR A FEW MINUTES IN TRAFFIC: WOULD NEVER DOZE
HOW LIKELY ARE YOU TO NOD OFF OR FALL ASLEEP WHILE SITTING QUIETLY AFTER LUNCH WITHOUT ALCOHOL: WOULD NEVER DOZE
HOW LIKELY ARE YOU TO NOD OFF OR FALL ASLEEP WHEN YOU ARE A PASSENGER IN A CAR FOR AN HOUR WITHOUT A BREAK: SLIGHT CHANCE OF DOZING
HOW LIKELY ARE YOU TO NOD OFF OR FALL ASLEEP WHILE LYING DOWN TO REST IN THE AFTERNOON WHEN CIRCUMSTANCES PERMIT: MODERATE CHANCE OF DOZING
HOW LIKELY ARE YOU TO NOD OFF OR FALL ASLEEP WHILE LYING DOWN TO REST IN THE AFTERNOON WHEN CIRCUMSTANCES PERMIT: MODERATE CHANCE OF DOZING
HOW LIKELY ARE YOU TO NOD OFF OR FALL ASLEEP WHILE SITTING AND READING: WOULD NEVER DOZE
ESS TOTAL SCORE: 4
HOW LIKELY ARE YOU TO NOD OFF OR FALL ASLEEP WHILE SITTING INACTIVE IN A PUBLIC PLACE: WOULD NEVER DOZE
HOW LIKELY ARE YOU TO NOD OFF OR FALL ASLEEP WHILE SITTING AND TALKING TO SOMEONE: WOULD NEVER DOZE
HOW LIKELY ARE YOU TO NOD OFF OR FALL ASLEEP WHILE SITTING AND TALKING TO SOMEONE: WOULD NEVER DOZE
HOW LIKELY ARE YOU TO NOD OFF OR FALL ASLEEP WHILE WATCHING TV: SLIGHT CHANCE OF DOZING
HOW LIKELY ARE YOU TO NOD OFF OR FALL ASLEEP WHILE SITTING INACTIVE IN A PUBLIC PLACE: WOULD NEVER DOZE
HOW LIKELY ARE YOU TO NOD OFF OR FALL ASLEEP WHILE WATCHING TV: SLIGHT CHANCE OF DOZING
HOW LIKELY ARE YOU TO NOD OFF OR FALL ASLEEP WHILE SITTING AND READING: WOULD NEVER DOZE

## 2025-02-05 ENCOUNTER — OFFICE VISIT (OUTPATIENT)
Dept: PULMONOLOGY | Age: 65
End: 2025-02-05
Payer: COMMERCIAL

## 2025-02-05 VITALS
DIASTOLIC BLOOD PRESSURE: 77 MMHG | WEIGHT: 245 LBS | OXYGEN SATURATION: 97 % | SYSTOLIC BLOOD PRESSURE: 133 MMHG | HEIGHT: 68 IN | RESPIRATION RATE: 20 BRPM | BODY MASS INDEX: 37.13 KG/M2 | HEART RATE: 78 BPM

## 2025-02-05 DIAGNOSIS — F51.04 CHRONIC INSOMNIA: Primary | ICD-10-CM

## 2025-02-05 PROCEDURE — 99214 OFFICE O/P EST MOD 30 MIN: CPT

## 2025-02-05 NOTE — PROGRESS NOTES
MA Communication:  The following orders are received by verbal communication from Marcia Caballero PA-C.     Orders include:    Sleep log X5 given   [facility] pathway given   3 month f/u

## 2025-02-05 NOTE — PATIENT INSTRUCTIONS
Insomnia Treatment:   Book:  Say Panda To Insomnia by Dr. Manpreet Villanueva   Online programs:   CBTforLoginza[employer]  ($50 for basic program) - same author and program idea as the book   Free program:  [email or web address]-kzfxj-gvltxnty-modules/ then click \"view module\" under CBT-I      Or, direct link (can be a little finicky) is:  [email or web address]        It was great to see you again and take care Milli!  -Marcia      Never drive a car or operate a motorized vehicle while drowsy or sleepy.       Sleep Hygiene... Important practices for better sleep:  Avoid naps. This will ensure you are sleepy at bedtime.  If you have to take a nap, sleep 30 minutes or less before 3 pm.  Have a fixed bedtime and awakening time. The human body thrives on routines. Only deviate from these set times by no more than 1 hour, including on weekends.  Avoid exposure to electronics/screens within 2 hours of your desired sleep time. Light from screens inhibits melatonin production which stops our brain from helping us get to sleep.   Go to bed only when sleepy; this reduces the time you are awake in bed (which can lead to frustration and negative thoughts about sleep). If you can't fall asleep within 15-30 minutes, get up and do something boring until you feel sleepy again. Absolutely no electronic screens during this time.    Only use your bed for sleeping & intimacy. Do not use your bed as an office, workroom or recreation room.    Develop sleep rituals that you go through the same way every night.  Stay away from stimulants such as caffeine and nicotine. Caffeine can remain in your system for well over 10 hours, so no caffeine after lunch. Caffeine is found in tea, cola, coffee, cocoa and chocolate.    Avoid alcohol 2-4 hours before bedtime. As alcohol is metabolized, the result is a stimulant or \"wake-up\" effect and will cause fragmented

## 2025-02-05 NOTE — PROGRESS NOTES
SLEEP MEDICINE PROGRESS NOTE  CC: Insomnia  Referring Provider: Dr. Adela Rebollar    Interval History 2/5/25:  3 mo Insomnia med f/u   -  Had a significant increase in CPAP usage after LOV but did not meet compliance and did not perceive any benefit to PAP use.  Continued to struggle with tolerance and returned the device.  Sleep has not been as good since LOV.  She has lost ~50 lbs since starting GLP1 agonist (tirzepatide --> semaglutide) and just recently reached maintenance dose of semaglutide so hopefully more to come.  Has interest in [facility] and hopeful she can be a candidate.  Not really interested in MAD as she predicts she would not tolerate this in her mouth.  Is relatively satisfied with her sleep when 30 mg Temazepam is used but has been needing to take both tablets every night so this is what she's disappointed in.  Does not feel like sleep is deep and good quality.  Still maintaining sleep hygiene, sleep scheduling and stimulus control techniques she's learned.     Interval History 11/1/24:  31-90  -  Set up with AirSense 10 8/29/24.  Pt requested lower pressure so decreased to APAP 4-8 which somewhat helped with tolerance.  Has tried 2 mask styles.  Has adjusted humidity upward, has heated tubing.  She is not sure what is so bothersome about CPAP but just wants it off of her face at night.  She can fall asleep with it on but then unknowingly removes it pretty soon into the night.  Nothing in particular that is bothersome while she is awake and wearing it.  Tolerance is not improved even with taking Temazepam or increasing dose.  -  LUCIANO treated with benefit with APAP 4-8; used 1:19 hrs avg with compliance >4 hour use 0/30 (used 11) days, residual AHI 0.3.  Pressures: median 4.2, 95th% 5.3, max 5.4.  ESS: 7.     -  Continues using sleep log for her own benefit.  This helps her to look at her sleep objectively and prove to herself that she is getting more sleep than what he mind estimates.  Discussed

## 2025-02-19 ENCOUNTER — TELEPHONE (OUTPATIENT)
Dept: ADMINISTRATIVE | Age: 65
End: 2025-02-19

## 2025-02-19 NOTE — TELEPHONE ENCOUNTER
Submitted PA for Zepbound 2.5MG/0.5ML pen-injectors   Via CMM Key: [id number]  STATUS: PENDING.    Follow up done daily; if no decision with in three days we will refax.  If another three days goes by with no decision will call the insurance for status.

## 2025-03-11 DIAGNOSIS — F51.04 CHRONIC INSOMNIA: ICD-10-CM

## 2025-03-11 RX ORDER — TEMAZEPAM 15 MG/1
CAPSULE ORAL
Qty: 180 CAPSULE | Refills: 0 | OUTPATIENT
Start: 2025-03-11 | End: 2025-06-09

## 2025-03-12 ENCOUNTER — TELEPHONE (OUTPATIENT)
Dept: PULMONOLOGY | Age: 65
End: 2025-03-12

## 2025-03-13 NOTE — TELEPHONE ENCOUNTER
Submitted PA for Temazepam 15MG capsules   Via CMM Key: [id number]  STATUS: Your PA has been resolved, no additional PA is required.      Follow up done daily; if no decision with in three days we will refax.  If another three days goes by with no decision will call the insurance for status.

## 2025-06-08 ASSESSMENT — SLEEP AND FATIGUE QUESTIONNAIRES
HOW LIKELY ARE YOU TO NOD OFF OR FALL ASLEEP WHILE LYING DOWN TO REST IN THE AFTERNOON WHEN CIRCUMSTANCES PERMIT: SLIGHT CHANCE OF DOZING
HOW LIKELY ARE YOU TO NOD OFF OR FALL ASLEEP IN A CAR, WHILE STOPPED FOR A FEW MINUTES IN TRAFFIC: WOULD NEVER DOZE
HOW LIKELY ARE YOU TO NOD OFF OR FALL ASLEEP WHILE SITTING AND READING: SLIGHT CHANCE OF DOZING
HOW LIKELY ARE YOU TO NOD OFF OR FALL ASLEEP WHILE LYING DOWN TO REST IN THE AFTERNOON WHEN CIRCUMSTANCES PERMIT: SLIGHT CHANCE OF DOZING
HOW LIKELY ARE YOU TO NOD OFF OR FALL ASLEEP WHILE WATCHING TV: SLIGHT CHANCE OF DOZING
HOW LIKELY ARE YOU TO NOD OFF OR FALL ASLEEP WHILE SITTING QUIETLY AFTER LUNCH WITHOUT ALCOHOL: WOULD NEVER DOZE
ESS TOTAL SCORE: 5
HOW LIKELY ARE YOU TO NOD OFF OR FALL ASLEEP WHILE SITTING AND READING: SLIGHT CHANCE OF DOZING
HOW LIKELY ARE YOU TO NOD OFF OR FALL ASLEEP IN A CAR, WHILE STOPPED FOR A FEW MINUTES IN TRAFFIC: WOULD NEVER DOZE
HOW LIKELY ARE YOU TO NOD OFF OR FALL ASLEEP WHILE SITTING QUIETLY AFTER LUNCH WITHOUT ALCOHOL: WOULD NEVER DOZE
HOW LIKELY ARE YOU TO NOD OFF OR FALL ASLEEP WHILE SITTING AND TALKING TO SOMEONE: WOULD NEVER DOZE
HOW LIKELY ARE YOU TO NOD OFF OR FALL ASLEEP WHILE SITTING INACTIVE IN A PUBLIC PLACE: WOULD NEVER DOZE
HOW LIKELY ARE YOU TO NOD OFF OR FALL ASLEEP WHILE SITTING AND TALKING TO SOMEONE: WOULD NEVER DOZE
HOW LIKELY ARE YOU TO NOD OFF OR FALL ASLEEP WHILE SITTING INACTIVE IN A PUBLIC PLACE: WOULD NEVER DOZE
HOW LIKELY ARE YOU TO NOD OFF OR FALL ASLEEP WHEN YOU ARE A PASSENGER IN A CAR FOR AN HOUR WITHOUT A BREAK: MODERATE CHANCE OF DOZING
HOW LIKELY ARE YOU TO NOD OFF OR FALL ASLEEP WHILE WATCHING TV: SLIGHT CHANCE OF DOZING
HOW LIKELY ARE YOU TO NOD OFF OR FALL ASLEEP WHEN YOU ARE A PASSENGER IN A CAR FOR AN HOUR WITHOUT A BREAK: MODERATE CHANCE OF DOZING

## 2025-06-11 ENCOUNTER — OFFICE VISIT (OUTPATIENT)
Dept: PULMONOLOGY | Age: 65
End: 2025-06-11
Payer: COMMERCIAL

## 2025-06-11 VITALS
DIASTOLIC BLOOD PRESSURE: 61 MMHG | BODY MASS INDEX: 32.89 KG/M2 | HEART RATE: 77 BPM | WEIGHT: 217 LBS | HEIGHT: 68 IN | SYSTOLIC BLOOD PRESSURE: 105 MMHG | OXYGEN SATURATION: 95 % | RESPIRATION RATE: 20 BRPM

## 2025-06-11 DIAGNOSIS — E66.811 CLASS 1 OBESITY DUE TO EXCESS CALORIES WITH SERIOUS COMORBIDITY AND BODY MASS INDEX (BMI) OF 32.0 TO 32.9 IN ADULT: ICD-10-CM

## 2025-06-11 DIAGNOSIS — F51.04 CHRONIC INSOMNIA: Primary | ICD-10-CM

## 2025-06-11 DIAGNOSIS — E66.09 CLASS 1 OBESITY DUE TO EXCESS CALORIES WITH SERIOUS COMORBIDITY AND BODY MASS INDEX (BMI) OF 32.0 TO 32.9 IN ADULT: ICD-10-CM

## 2025-06-11 PROCEDURE — 99214 OFFICE O/P EST MOD 30 MIN: CPT

## 2025-06-11 RX ORDER — TEMAZEPAM 15 MG/1
30 CAPSULE ORAL NIGHTLY PRN
COMMUNITY
End: 2025-06-11 | Stop reason: SDUPTHER

## 2025-06-11 RX ORDER — TEMAZEPAM 15 MG/1
30 CAPSULE ORAL NIGHTLY PRN
Qty: 120 CAPSULE | Refills: 0 | Status: SHIPPED | OUTPATIENT
Start: 2025-06-11 | End: 2025-08-10

## 2025-06-11 RX ORDER — TEMAZEPAM 15 MG/1
30 CAPSULE ORAL NIGHTLY PRN
Qty: 60 CAPSULE | Refills: 0 | Status: SHIPPED | OUTPATIENT
Start: 2025-08-10 | End: 2025-09-09

## 2025-06-11 NOTE — PROGRESS NOTES
often weather-related per pt   H/O complex parasomnias   Obesity, S/P bariatric surgery, on Semaglutide - doing great with weight loss   Comorbid conditions:  HTN, HLD, anxiety well controlled on Zoloft   Former smoker    PLAN:   Returned CPAP to [facility].  Was on APAP 4-7 (tolerance). Had AirSense 10 set up 8/29/24.    Sleep hygiene tips discussed & provided  Avoid electronics at night; tablet to read in dark mode   No driving while sleepy. Weight loss recommended and continues working on this.  Pathophysiology & complications of untreated LUCIANO & systemic benefits of PAP & treatment discussed.   Temazepam 30 mg nightly.  Uses lowest effective dose each night and sometimes none at all.    Have thoroughly discussed R/B of benzodiazepines with pt, particularly in older adults & post-menopausal women, with attention paid to need for taking immediately before bedtime, risk of respiratory depression, risk of excessive sedation, & falls.  No signs of abuse or diversion.    Medication contract 7/17/24 - update NOV.  PDMP reviewed today 6/11/25.   BZRAs contraindicated in this patient with h/o complex parasomnias   CBTi is the safest and most effective treatment for insomnia.  Has completed sessions 1-3 and several sleep logs.  Benefits from sleep logs  Have discussed alternative LUCIANO treatments to CPAP several times, including HNS & MAD.    Given information for [facility] for MAD  [facility] care pathway given & previously discussed.  Have thoroughly discussed R/B of Inspire implant and care pathway.  BMI <35 for federal plan.  Pt will call if interested in proceeding with DISE, discussed today.    Med F/U 3 months insomnia (prior to 9/9/25)

## 2025-08-05 RX ORDER — ATORVASTATIN CALCIUM 10 MG/1
10 TABLET, FILM COATED ORAL NIGHTLY
Qty: 90 TABLET | Refills: 0 | Status: SHIPPED | OUTPATIENT
Start: 2025-08-05